# Patient Record
Sex: MALE | Race: WHITE | NOT HISPANIC OR LATINO | URBAN - METROPOLITAN AREA
[De-identification: names, ages, dates, MRNs, and addresses within clinical notes are randomized per-mention and may not be internally consistent; named-entity substitution may affect disease eponyms.]

---

## 2019-05-31 ENCOUNTER — EMERGENCY (EMERGENCY)
Facility: HOSPITAL | Age: 24
LOS: 1 days | Discharge: HOME | End: 2019-05-31
Attending: EMERGENCY MEDICINE
Payer: COMMERCIAL

## 2019-05-31 VITALS
OXYGEN SATURATION: 97 % | RESPIRATION RATE: 18 BRPM | WEIGHT: 195.11 LBS | HEART RATE: 60 BPM | DIASTOLIC BLOOD PRESSURE: 82 MMHG | SYSTOLIC BLOOD PRESSURE: 128 MMHG | HEIGHT: 72 IN | TEMPERATURE: 98 F

## 2019-05-31 PROCEDURE — 73000 X-RAY EXAM OF COLLAR BONE: CPT | Mod: 26,LT

## 2019-05-31 PROCEDURE — 99053 MED SERV 10PM-8AM 24 HR FAC: CPT

## 2019-05-31 PROCEDURE — 23500 CLTX CLAVICULAR FX W/O MNPJ: CPT | Mod: LT

## 2019-05-31 PROCEDURE — 23500 CLTX CLAVICULAR FX W/O MNPJ: CPT | Mod: 54

## 2019-05-31 PROCEDURE — 99284 EMERGENCY DEPT VISIT MOD MDM: CPT | Mod: 25

## 2019-05-31 PROCEDURE — 99283 EMERGENCY DEPT VISIT LOW MDM: CPT | Mod: 25

## 2019-05-31 PROCEDURE — 73000 X-RAY EXAM OF COLLAR BONE: CPT

## 2019-05-31 RX ORDER — IBUPROFEN 200 MG
600 TABLET ORAL ONCE
Refills: 0 | Status: COMPLETED | OUTPATIENT
Start: 2019-05-31 | End: 2019-05-31

## 2019-05-31 RX ADMIN — Medication 600 MILLIGRAM(S): at 23:47

## 2019-05-31 RX ADMIN — Medication 600 MILLIGRAM(S): at 23:27

## 2019-05-31 NOTE — ED PROVIDER NOTE - OBJECTIVE STATEMENT
23to male pt, no PMHx, ambulatory c/o left clavicle fracture on a sling s/p injury this evening. Pt stated he hit left clavicle by a someone's shoulder during Lacrosse game. Reports pain and difficulty moving affected L extremity afterward. Denies LOCor head injury. Denies numbness/tingling of the affected extremity. No other injuries.

## 2019-05-31 NOTE — ED PROVIDER NOTE - PHYSICAL EXAMINATION
NAD, VSS, Afebrile, + Left mid clavicle swelling and tender, no tenting. No spinal tender. No rib or CVA tender. Lungs clear. Neuro- intact.

## 2019-05-31 NOTE — ED PROVIDER NOTE - CLINICAL SUMMARY MEDICAL DECISION MAKING FREE TEXT BOX
Dr. Roger (Attending Physician)  left clavicle injury with stick during lacrosse game no arm pain numbness tingling, no chest pain or shortness of breath. ortho sent in patient from team physician so evaluating at bedside. Will xray and reassess.

## 2019-05-31 NOTE — ED PROVIDER NOTE - NSFOLLOWUPINSTRUCTIONS_ED_ALL_ED_FT
Ice to pain area.  Keep continue your sling.  Take Motrin (Advil or Ibuprofen) 600mg every 8hours for pain with food.  Tylenol 500mg or 650mg every 6hours for pain as needed.  Follow up Ice to pain area.  Keep continue your sling.  Take Motrin (Advil or Ibuprofen) 600mg every 8hours for pain with food.  Tylenol 500mg or 650mg every 6hours for pain as needed.  Follow up with orthopedist Dr. William as scheduled, call Monday for an appointment.  Return for any concerns or worsening symptoms.

## 2019-05-31 NOTE — ED PROVIDER NOTE - ATTENDING CONTRIBUTION TO CARE
Dr. Roger (Attending Physician)  I performed a history and physical exam of the patient and discussed their management with the advanced care provider. I reviewed the advanced care provider's note and agree with the documented findings and plan of care. My medical decision making and objective findings are found above.